# Patient Record
Sex: FEMALE | Race: WHITE | NOT HISPANIC OR LATINO | Employment: FULL TIME | URBAN - METROPOLITAN AREA
[De-identification: names, ages, dates, MRNs, and addresses within clinical notes are randomized per-mention and may not be internally consistent; named-entity substitution may affect disease eponyms.]

---

## 2023-07-05 PROBLEM — J20.9 BRONCHITIS, ACUTE: Status: ACTIVE | Noted: 2023-07-05

## 2023-07-05 PROBLEM — H66.92 LEFT OTITIS MEDIA: Status: RESOLVED | Noted: 2023-07-05 | Resolved: 2023-07-05

## 2023-07-05 PROBLEM — H65.92 LEFT SEROUS OTITIS MEDIA: Status: ACTIVE | Noted: 2023-07-05

## 2023-07-05 PROBLEM — J01.90 SINUSITIS, ACUTE: Status: ACTIVE | Noted: 2023-07-05

## 2023-07-05 PROBLEM — H66.91 RIGHT OTITIS MEDIA: Status: ACTIVE | Noted: 2023-07-05

## 2023-07-05 RX ORDER — MULTIVITAMIN
1 TABLET ORAL DAILY
COMMUNITY

## 2023-07-05 RX ORDER — FLUTICASONE PROPIONATE 50 MCG
1 SPRAY, SUSPENSION (ML) NASAL DAILY
COMMUNITY

## 2023-07-06 ENCOUNTER — LAB (OUTPATIENT)
Dept: LAB | Facility: LAB | Age: 43
End: 2023-07-06
Payer: COMMERCIAL

## 2023-07-06 ENCOUNTER — OFFICE VISIT (OUTPATIENT)
Dept: PRIMARY CARE | Facility: CLINIC | Age: 43
End: 2023-07-06
Payer: COMMERCIAL

## 2023-07-06 VITALS — BODY MASS INDEX: 29.44 KG/M2 | SYSTOLIC BLOOD PRESSURE: 120 MMHG | DIASTOLIC BLOOD PRESSURE: 64 MMHG | WEIGHT: 188 LBS

## 2023-07-06 DIAGNOSIS — J45.20 MILD INTERMITTENT ASTHMA, UNSPECIFIED WHETHER COMPLICATED (HHS-HCC): ICD-10-CM

## 2023-07-06 DIAGNOSIS — Z82.49 FAMILY HISTORY OF CORONARY ARTERY DISEASE: ICD-10-CM

## 2023-07-06 DIAGNOSIS — Z00.00 WELL ADULT EXAM: Primary | ICD-10-CM

## 2023-07-06 DIAGNOSIS — R63.5 WEIGHT GAIN: ICD-10-CM

## 2023-07-06 DIAGNOSIS — Z12.31 ENCOUNTER FOR SCREENING MAMMOGRAM FOR MALIGNANT NEOPLASM OF BREAST: ICD-10-CM

## 2023-07-06 DIAGNOSIS — Z00.00 WELL ADULT EXAM: ICD-10-CM

## 2023-07-06 LAB
ALANINE AMINOTRANSFERASE (SGPT) (U/L) IN SER/PLAS: 21 U/L (ref 7–45)
ALBUMIN (G/DL) IN SER/PLAS: 4.6 G/DL (ref 3.4–5)
ALKALINE PHOSPHATASE (U/L) IN SER/PLAS: 45 U/L (ref 33–110)
ANION GAP IN SER/PLAS: 21 MMOL/L (ref 10–20)
ASPARTATE AMINOTRANSFERASE (SGOT) (U/L) IN SER/PLAS: 25 U/L (ref 9–39)
BILIRUBIN TOTAL (MG/DL) IN SER/PLAS: 0.4 MG/DL (ref 0–1.2)
CALCIDIOL (25 OH VITAMIN D3) (NG/ML) IN SER/PLAS: 25 NG/ML
CALCIUM (MG/DL) IN SER/PLAS: 9.4 MG/DL (ref 8.6–10.3)
CARBON DIOXIDE, TOTAL (MMOL/L) IN SER/PLAS: 27 MMOL/L (ref 21–32)
CHLORIDE (MMOL/L) IN SER/PLAS: 99 MMOL/L (ref 98–107)
CHOLESTEROL (MG/DL) IN SER/PLAS: 231 MG/DL (ref 0–199)
CHOLESTEROL IN HDL (MG/DL) IN SER/PLAS: 55.4 MG/DL
CHOLESTEROL/HDL RATIO: 4.2
COBALAMIN (VITAMIN B12) (PG/ML) IN SER/PLAS: 480 PG/ML (ref 211–911)
CREATININE (MG/DL) IN SER/PLAS: 0.63 MG/DL (ref 0.5–1.05)
GFR FEMALE: >90 ML/MIN/1.73M2
GLUCOSE (MG/DL) IN SER/PLAS: 90 MG/DL (ref 74–99)
LDL: 154 MG/DL (ref 0–99)
POTASSIUM (MMOL/L) IN SER/PLAS: 4.6 MMOL/L (ref 3.5–5.3)
PROTEIN TOTAL: 7.5 G/DL (ref 6.4–8.2)
SODIUM (MMOL/L) IN SER/PLAS: 142 MMOL/L (ref 136–145)
THYROTROPIN (MIU/L) IN SER/PLAS BY DETECTION LIMIT <= 0.05 MIU/L: 1.28 MIU/L (ref 0.44–3.98)
TRIGLYCERIDE (MG/DL) IN SER/PLAS: 106 MG/DL (ref 0–149)
UREA NITROGEN (MG/DL) IN SER/PLAS: 10 MG/DL (ref 6–23)
VLDL: 21 MG/DL (ref 0–40)

## 2023-07-06 PROCEDURE — 36415 COLL VENOUS BLD VENIPUNCTURE: CPT

## 2023-07-06 PROCEDURE — 99396 PREV VISIT EST AGE 40-64: CPT | Performed by: FAMILY MEDICINE

## 2023-07-06 PROCEDURE — 82607 VITAMIN B-12: CPT

## 2023-07-06 PROCEDURE — 82306 VITAMIN D 25 HYDROXY: CPT

## 2023-07-06 PROCEDURE — 84443 ASSAY THYROID STIM HORMONE: CPT

## 2023-07-06 PROCEDURE — 80053 COMPREHEN METABOLIC PANEL: CPT

## 2023-07-06 PROCEDURE — 1036F TOBACCO NON-USER: CPT | Performed by: FAMILY MEDICINE

## 2023-07-06 PROCEDURE — 80061 LIPID PANEL: CPT

## 2023-07-06 RX ORDER — LEVONORGESTREL 52 MG/1
1 INTRAUTERINE DEVICE INTRAUTERINE ONCE
COMMUNITY

## 2023-07-06 RX ORDER — ALBUTEROL SULFATE 90 UG/1
2 AEROSOL, METERED RESPIRATORY (INHALATION) EVERY 6 HOURS PRN
Qty: 8.5 G | Refills: 3 | Status: SHIPPED | OUTPATIENT
Start: 2023-07-06 | End: 2023-07-20

## 2023-07-06 RX ORDER — OMEPRAZOLE 20 MG/1
20 CAPSULE, DELAYED RELEASE ORAL
COMMUNITY
End: 2023-07-20 | Stop reason: SDUPTHER

## 2023-07-06 ASSESSMENT — PATIENT HEALTH QUESTIONNAIRE - PHQ9
SUM OF ALL RESPONSES TO PHQ9 QUESTIONS 1 AND 2: 0
1. LITTLE INTEREST OR PLEASURE IN DOING THINGS: NOT AT ALL
2. FEELING DOWN, DEPRESSED OR HOPELESS: NOT AT ALL

## 2023-07-06 ASSESSMENT — ENCOUNTER SYMPTOMS
GASTROINTESTINAL NEGATIVE: 1
RESPIRATORY NEGATIVE: 1
CONSTITUTIONAL NEGATIVE: 1
RHINORRHEA: 1
SLEEP DISTURBANCE: 1
CARDIOVASCULAR NEGATIVE: 1
NERVOUS/ANXIOUS: 1

## 2023-07-06 NOTE — PROGRESS NOTES
Subjective   Patient ID: Earlene Tadeo is a 43 y.o. female who presents for New Patient Visit (Establish care. Wants to talk about her weight. ).    Pt presents to [facility] and [facility]    Moved to [place] about 2 years ago, was near [place] prior    Weight gain over the last several years  She has always been physically active  Recent move  Does not have the best diet   [facility]     P Med Hx: seasonal allergies, GERD  Asthma   P Surg HX: Bladder surgery (postpartum issues)  Tonsillectomy  Septoplasty    Meds: Flonase, Prilosec  MVI    NKDA    Fam Hx: Mother: CHF, defibrillator and pacemaker, high cholesterol  Mother: duodenal CA    HM: last PAP: one year ago, has another one scheduled, follows with GYN  Is due for a mammogram     Exercise: cardio: several times a week, walking and lifts weights  Caffeine intake: gave up soda this year   2 cups of coffee  Adequate water intake  No tobacco  Alcohol: socially            Review of Systems   Constitutional: Negative.    HENT:  Positive for rhinorrhea.         Seasonal allergies  Takes Flonase  PRN Albuterol      Respiratory: Negative.     Cardiovascular: Negative.    Gastrointestinal: Negative.    Genitourinary:         Mirena IUD  No menses   Allergic/Immunologic: Positive for environmental allergies.   Psychiatric/Behavioral:  Positive for sleep disturbance. Negative for suicidal ideas. The patient is nervous/anxious.         Anxiety/depression  Was treated with meds in the past, weight gain  Denies SI/HI       Objective   /64   Wt 85.3 kg (188 lb)   BMI 29.44 kg/m²     Physical Exam  Constitutional:       Appearance: Normal appearance.   HENT:      Right Ear: Tympanic membrane, ear canal and external ear normal.      Left Ear: Tympanic membrane, ear canal and external ear normal.   Eyes:      Extraocular Movements: Extraocular movements intact.      Conjunctiva/sclera: Conjunctivae normal.      Pupils:  Pupils are equal, round, and reactive to light.   Cardiovascular:      Rate and Rhythm: Normal rate and regular rhythm.      Heart sounds: Normal heart sounds.   Pulmonary:      Effort: Pulmonary effort is normal.      Breath sounds: Normal breath sounds.   Abdominal:      General: Abdomen is flat. Bowel sounds are normal.      Palpations: Abdomen is soft.   Musculoskeletal:         General: Normal range of motion.   Skin:     General: Skin is warm and dry.   Neurological:      General: No focal deficit present.      Mental Status: She is alert and oriented to person, place, and time. Mental status is at baseline.   Psychiatric:         Mood and Affect: Mood normal.         Behavior: Behavior normal.         Thought Content: Thought content normal.         Judgment: Judgment normal.       Pt is new to our practice, P Med Hx, P Surg Hx, Fam Hx, Meds and Allergies all reviewed today    Assessment/Plan   Diagnoses and all orders for this visit:  Well adult exam  -     Lipid Panel; Future  -     Comprehensive metabolic panel; Future  -     Referral to Ophthalmology; Future  Mild intermittent asthma, unspecified whether complicated  -     albuterol (ProAir HFA) 90 mcg/actuation inhaler; Inhale 2 puffs every 6 hours if needed for wheezing or shortness of breath.  Family history of coronary artery disease  -     Lipid Panel; Future  -     Comprehensive metabolic panel; Future  Encounter for screening mammogram for malignant neoplasm of breast  -     BI mammo bilateral screening tomosynthesis; Future  Weight gain  -     Tsh With Reflex To Free T4 If Abnormal; Future  -     Vitamin B12; Future  -     Vitamin D 25-Hydroxy,Total; Future  -     Referral to Nutrition Services; Future  -     Referral to Comprehensive Weight Loss; Future    Follow up pending lab results  Discussed w/ pt we can manage PAP and well woman care if she wishes to see our office for this  Advised pt to call sooner with any questions or concerns   Melba A  Christiano, DO

## 2023-07-11 ENCOUNTER — TELEPHONE (OUTPATIENT)
Dept: PRIMARY CARE | Facility: CLINIC | Age: 43
End: 2023-07-11
Payer: COMMERCIAL

## 2023-07-11 NOTE — TELEPHONE ENCOUNTER
Discussed with patient. Patient would like to know what are the affects to having low vit d levels?

## 2023-07-11 NOTE — TELEPHONE ENCOUNTER
Please notify the pt that her Vit D was low at 25. I would recc she take Vitamin D 3, 2,000 units daily.     Melba Alvarado, DO

## 2023-07-12 DIAGNOSIS — J45.20 MILD INTERMITTENT ASTHMA, UNSPECIFIED WHETHER COMPLICATED (HHS-HCC): ICD-10-CM

## 2023-07-12 DIAGNOSIS — K21.9 GASTROESOPHAGEAL REFLUX DISEASE, UNSPECIFIED WHETHER ESOPHAGITIS PRESENT: Primary | ICD-10-CM

## 2023-07-20 RX ORDER — ALBUTEROL SULFATE 90 UG/1
2 AEROSOL, METERED RESPIRATORY (INHALATION) EVERY 6 HOURS PRN
Qty: 17 G | Refills: 3 | Status: SHIPPED | OUTPATIENT
Start: 2023-07-20 | End: 2024-07-19

## 2023-07-20 RX ORDER — OMEPRAZOLE 20 MG/1
20 CAPSULE, DELAYED RELEASE ORAL
Qty: 90 CAPSULE | Refills: 0 | Status: SHIPPED | OUTPATIENT
Start: 2023-07-20 | End: 2023-11-06 | Stop reason: SDUPTHER

## 2023-09-25 ENCOUNTER — OFFICE VISIT (OUTPATIENT)
Dept: PRIMARY CARE | Facility: CLINIC | Age: 43
End: 2023-09-25
Payer: COMMERCIAL

## 2023-09-25 VITALS
HEIGHT: 67 IN | DIASTOLIC BLOOD PRESSURE: 80 MMHG | BODY MASS INDEX: 30.13 KG/M2 | SYSTOLIC BLOOD PRESSURE: 126 MMHG | WEIGHT: 192 LBS

## 2023-09-25 DIAGNOSIS — F32.A DEPRESSION, UNSPECIFIED DEPRESSION TYPE: Primary | ICD-10-CM

## 2023-09-25 PROBLEM — J01.90 SINUSITIS, ACUTE: Status: RESOLVED | Noted: 2023-07-05 | Resolved: 2023-09-25

## 2023-09-25 PROBLEM — H66.91 RIGHT OTITIS MEDIA: Status: RESOLVED | Noted: 2023-07-05 | Resolved: 2023-09-25

## 2023-09-25 PROBLEM — J20.9 BRONCHITIS, ACUTE: Status: RESOLVED | Noted: 2023-07-05 | Resolved: 2023-09-25

## 2023-09-25 PROBLEM — H65.92 LEFT SEROUS OTITIS MEDIA: Status: RESOLVED | Noted: 2023-07-05 | Resolved: 2023-09-25

## 2023-09-25 PROCEDURE — 1036F TOBACCO NON-USER: CPT | Performed by: FAMILY MEDICINE

## 2023-09-25 PROCEDURE — 99214 OFFICE O/P EST MOD 30 MIN: CPT | Performed by: FAMILY MEDICINE

## 2023-09-25 RX ORDER — BUPROPION HYDROCHLORIDE 150 MG/1
150 TABLET ORAL EVERY MORNING
Qty: 30 TABLET | Refills: 2 | Status: SHIPPED | OUTPATIENT
Start: 2023-09-25 | End: 2023-11-06 | Stop reason: SDUPTHER

## 2023-09-25 RX ORDER — PSYLLIUM HUSK 0.4 G
5 CAPSULE ORAL 4 TIMES DAILY
COMMUNITY

## 2023-09-25 RX ORDER — BUTYROSPERMUM PARKII(SHEA BUTTER), SIMMONDSIA CHINENSIS (JOJOBA) SEED OIL, ALOE BARBADENSIS LEAF EXTRACT .01; 1; 3.5 G/100G; G/100G; G/100G
250 LIQUID TOPICAL 2 TIMES DAILY
COMMUNITY

## 2023-09-25 ASSESSMENT — PATIENT HEALTH QUESTIONNAIRE - PHQ9
SUM OF ALL RESPONSES TO PHQ9 QUESTIONS 1 & 2: 2
1. LITTLE INTEREST OR PLEASURE IN DOING THINGS: SEVERAL DAYS
10. IF YOU CHECKED OFF ANY PROBLEMS, HOW DIFFICULT HAVE THESE PROBLEMS MADE IT FOR YOU TO DO YOUR WORK, TAKE CARE OF THINGS AT HOME, OR GET ALONG WITH OTHER PEOPLE: VERY DIFFICULT
2. FEELING DOWN, DEPRESSED OR HOPELESS: SEVERAL DAYS

## 2023-09-25 ASSESSMENT — ENCOUNTER SYMPTOMS: CONSTITUTIONAL NEGATIVE: 1

## 2023-09-25 NOTE — PROGRESS NOTES
"Subjective   Patient ID: Earlene Tadeo is a 43 y.o. female who presents for fatigue and feeling down.    Pt presents for follow up:     She is being seen at the weight loss clinic   She was started on Ozempic, this is an 8 week program  She is also receiving B 12 injections   She has lost body fat, gaining muscle  She is lifting weights and cardio 4-5 times per week     She overall is not feeling well,mentally and physically  She started to feel down a few months ago, did not want to get out of bed  Took a few sick days   This was around her menses  She has an IUD in place  She has waves of feeling down and fatigued, unmotivated   She has tried anti depressants, most recent one she gained weight   She thinks it was Lexapro  No SI/H              Review of Systems   Constitutional: Negative.    Psychiatric/Behavioral:          Pos for depression       Objective   Ht 1.702 m (5' 7\")   Wt 87.1 kg (192 lb)   BMI 30.07 kg/m²     Physical Exam  Constitutional:       Appearance: Normal appearance.   Cardiovascular:      Rate and Rhythm: Normal rate and regular rhythm.      Heart sounds: Normal heart sounds.   Pulmonary:      Effort: Pulmonary effort is normal.      Breath sounds: Normal breath sounds.   Abdominal:      General: Abdomen is flat. Bowel sounds are normal.      Palpations: Abdomen is soft.   Neurological:      Mental Status: She is alert.   Psychiatric:         Attention and Perception: Attention normal.         Mood and Affect: Mood normal.         Speech: Speech normal.         Behavior: Behavior normal.         Thought Content: Thought content normal.         Judgment: Judgment normal.         Assessment/Plan   Diagnoses and all orders for this visit:  Depression, unspecified depression type  -     buPROPion XL (Wellbutrin XL) 150 mg 24 hr tablet; Take 1 tablet (150 mg) by mouth once daily in the morning. Do not crush, chew, or split.  Discussed R/B/SE of this medication   Follow up appt in 6 weeks, advised " pt to call sooner with any questions or concerns    Melba Alvarado, DO

## 2023-11-02 DIAGNOSIS — K21.9 GASTROESOPHAGEAL REFLUX DISEASE, UNSPECIFIED WHETHER ESOPHAGITIS PRESENT: ICD-10-CM

## 2023-11-02 RX ORDER — OMEPRAZOLE 20 MG/1
20 CAPSULE, DELAYED RELEASE ORAL
Qty: 90 CAPSULE | Refills: 3 | OUTPATIENT
Start: 2023-11-02

## 2023-11-06 ENCOUNTER — OFFICE VISIT (OUTPATIENT)
Dept: PRIMARY CARE | Facility: CLINIC | Age: 43
End: 2023-11-06
Payer: COMMERCIAL

## 2023-11-06 DIAGNOSIS — F32.A DEPRESSION, UNSPECIFIED DEPRESSION TYPE: ICD-10-CM

## 2023-11-06 DIAGNOSIS — Z13.39 ADHD (ATTENTION DEFICIT HYPERACTIVITY DISORDER) EVALUATION: Primary | ICD-10-CM

## 2023-11-06 DIAGNOSIS — K21.9 GASTROESOPHAGEAL REFLUX DISEASE, UNSPECIFIED WHETHER ESOPHAGITIS PRESENT: ICD-10-CM

## 2023-11-06 PROCEDURE — 1036F TOBACCO NON-USER: CPT | Performed by: FAMILY MEDICINE

## 2023-11-06 PROCEDURE — 99214 OFFICE O/P EST MOD 30 MIN: CPT | Performed by: FAMILY MEDICINE

## 2023-11-06 RX ORDER — OMEPRAZOLE 20 MG/1
20 CAPSULE, DELAYED RELEASE ORAL
Qty: 90 CAPSULE | Refills: 1 | Status: SHIPPED | OUTPATIENT
Start: 2023-11-06

## 2023-11-06 RX ORDER — BUPROPION HYDROCHLORIDE 150 MG/1
150 TABLET ORAL EVERY MORNING
Qty: 90 TABLET | Refills: 1 | Status: SHIPPED | OUTPATIENT
Start: 2023-11-06 | End: 2024-02-05 | Stop reason: SDUPTHER

## 2023-11-06 ASSESSMENT — ENCOUNTER SYMPTOMS
CONSTIPATION: 1
NERVOUS/ANXIOUS: 1
ABDOMINAL PAIN: 0
BLOOD IN STOOL: 0

## 2023-11-06 NOTE — PROGRESS NOTES
Subjective   Patient ID: Earlene Tadeo is a 43 y.o. female who presents for Follow-up (6 week follow up after starting Bupropion ), Med Refill (Omeprazole and Bupropion- [facility]), and concentration (Discuss possible ADHD, difficulty concentrating, focusing, and remembering conversations).    Pt presents for followup:     Has been on Wellbutrin x 6 weeks  She is concerned about ADHD  She is waking up several times per night   No OTC meds  Overall she feels better on the Wellbutrin    GERD  She has missed her Prilosec and she feels her heartburn  She did see GI in the past, no scope per pt   She has been on Prlosec x 8 months  She is needing [employer] as well as TUMS or baking soda   Alcohol: socially  Caffeine: 2 cups/coffee/ per day,mushroom coffee  Rare  NSAID use   She has cur out soda  Reviewed her diet: she limits tomato based products  High protein diet          Review of Systems   Gastrointestinal:  Positive for constipation. Negative for abdominal pain and blood in stool.        Pos GERD   Psychiatric/Behavioral:  The patient is nervous/anxious.        Objective   There were no vitals taken for this visit.    Physical Exam  Constitutional:       Appearance: Normal appearance.   Cardiovascular:      Rate and Rhythm: Normal rate and regular rhythm.      Heart sounds: Normal heart sounds.   Pulmonary:      Effort: Pulmonary effort is normal.      Breath sounds: Normal breath sounds.   Abdominal:      General: Bowel sounds are normal. There is no distension.      Palpations: Abdomen is soft. There is no mass.      Tenderness: There is no abdominal tenderness. There is no guarding or rebound.      Hernia: No hernia is present.   Neurological:      Mental Status: She is alert.         Assessment/Plan   Diagnoses and all orders for this visit:  ADHD (attention deficit hyperactivity disorder) evaluation  -     Referral to Neurology; Future  Depression, unspecified depression type  -     buPROPion XL  (Wellbutrin XL) 150 mg 24 hr tablet; Take 1 tablet (150 mg) by mouth once daily in the morning. Do not crush, chew, or split.  Gastroesophageal reflux disease, unspecified whether esophagitis present  -     omeprazole (PriLOSEC) 20 mg DR capsule; Take 1 capsule (20 mg) by mouth once daily in the morning. Take before meals. Do not crush or chew.  -     Referral to Gastroenterology; Future    T/C dose increase of the wellbutrin over the next several months  Reviewed food to avoid with GERD  Follow up appt in 3 months  Advised pt to call sooner with any questions or concerns  Melba Alvarado, DO

## 2023-11-20 ENCOUNTER — NUTRITION (OUTPATIENT)
Dept: NUTRITION | Facility: CLINIC | Age: 43
End: 2023-11-20
Payer: COMMERCIAL

## 2023-11-20 DIAGNOSIS — R63.5 WEIGHT GAIN: Primary | ICD-10-CM

## 2023-11-20 PROCEDURE — 97802 MEDICAL NUTRITION INDIV IN: CPT | Performed by: DIETITIAN, REGISTERED

## 2023-11-20 NOTE — PROGRESS NOTES
Reason for Nutrition Visit:  Pt is a 43 y.o. female being seen at [facility] referred for:  1. Weight gain          Lab Results   Component Value Date    CHOL 231 (H) 07/06/2023    LDLF 154 (H) 07/06/2023    TRIG 106 07/06/2023    HDL 55.4 07/06/2023      Food and Nutrition Hx:  Pt mentions struggling with weight management for many years. She met with her PCP who recommended seeing a RDN. She tells that she has had elevated cholesterol since she was a teenage but never needed to get on medications and has lowered it on her own through lifestyle changes. She does mention a family hx of CVD with her mom and HTN on both sides. She has tried [facility] and tried a GLP-1, which helped but had to stop after 8 weeks and doesn't want to pay out of pocket for it. She is a vegetarian and eats a more carbohydrate focused diet. She sees a  and training for a half marathon in the Spring. She also gave up diet coke this last year. She makes shakes with benefiber, collagen and plant based protein. She does snack in the evening on snacks like frozen pizza.    24 Diet Recall:  Meal 1: skipped  Meal 2: pc of pizza leftover  Meal 3: 2 pcs of pizza  Snacks: cheese puffs, glass of non-alcoholic wine and osborne lite  Beverages: 4-5 glasses of water, 2 cups of coffee, wine or one beer a few times per week, unsweetened tea when eating out    Usual Diet Recall:  Meal 1: perfect protein bar or protein shake--benefiber, collagen, PB2 powder, Sakara Metabolism Powder and Ka'Ayush protein powder with water (some days fasts until lunch), coffee or mushroom powder  Snacks: if in the office--baked goods, candy  Meal 2: protein shake again or perfect bar  Meal 3: salad, frozen vegetables, with pizza, tofu, or plant based burger  Snack: popcorn, pretzels, pizza    Allergies: None  Intolerance: milk sometimes hard to digest, but cheese and yogurt  Appetite: Good  Intake: >75%  GI Symptoms : reflux Frequency:  intermittent  Swallowing Difficulty: No problems with swallowing  Dentition : own    Types of Activities:  1x per week, weight training 3x per week, walks daily and cardio 3-4x per week  Duration: 30-60 minutes daily    Sleep duration/quality : 7+ hours and disrupted sleep  Sleep disorders: none    Supplements: Multivitamin, Vitamin D, Calcium, Potassium, Probiotic, and Psyllium Husk  daily    Feelings of Hunger?: Yes and will eat  Physical Feeling: Sluggish  Binging: Periods of restriction then overeating  Cravings: Sweet  Energy Levels: Stable    Food Preparation: Partner/Spouse  Cooking Skills/Barriers: None reported  Grocery Shopping: Partner/Spouse    Eating Out Type: Restaurant and Take Out  1-2x per week  Convenience Foods: Canned Soup and frozen entrees      Nutrition Focused Physical Exam:    Performed/Deferred: Deferred as pt visually appears well-nourished with no signs of malnutrition    Muscle Wasting:  Temporal: None  Shoulder: None  None  Interosseous Muscle: None  Quadriceps: None    Loss of Subcutaneous Fat:  Eyes: None  Perioral: None  Triceps: None  Chest: None    Other Physical Findings:  Hair: None  None  Mouth: None  Skin: None  Nails: None  none    Malnutrition Present: No    Estimated Energy Needs:    Weight Maintanence: 1881 kcal/day, MSJ=1568x1.2, 87 g/pro/day, and 1 g/pro/kg/day  Weight Loss Needs: 1631 kcal/day, MSJ: 1881-250, 69 g/pro/day, and .8 g/pro/kg/day    Nutrition Diagnosis:    Diagnosis Statement 1:  Diagnosis Status: New  Diagnosis : Altered nutrition related lab values  related to  metabolic dysfunction  as evidenced by  high cholesterol (231) and LDL (154) on 07/06/2023    Diagnosis Statement 2:  Diagnosis Status: New  Diagnosis : Food and nutrition related knowledge deficit related to lack of or limited prior nutrition-related education as evidenced by no prior knowledge of need for food- and nutrition-related recommendations    Diagnosis Statement 3:    Diagnosis Status: New  Diagnosis : Inadequate fiber intake  related to food and nutrition related knowledge deficit concerning desirable quantities of fiber as evidenced by estimated intake of fiber that is insufficient when compared to recommended amounts (38 g/day for men and 25 g/day for women)    Nutrition Interventions:  Anti-Inflammatory Diet, Decreased Sodium Diet, Increased Fiber Diet, Increased Fluid Intake, Increased Omega-3 Diet, Increased Protein Diet, Increased Vitamin D Intake, and Low Saturated Fat Diet  Nutrition Counseling: Motivational Interviewing and Goal Setting  Coordination of Care: None    Nutrition Goals:  Nutrition Goals : Adequate fluid intake: 64 oz+  Decrease intake of added sugars  Decrease intake of saturated fats  Decrease sodium intake  Increase awareness and respond to hunger cues  Increase awareness and respond to satiety cues  Reduce Kcal Intake  Reduce LDL level  Weight Loss    Nutrition Recommendations:  Via teach back method patient verbalized understanding of the following topics:  1) Make a plate that is 1/2 filled with non-starchy vegetables (any vegetable other than potatoes, peas or corn), 1/4 filled with whole grain/starch and 1/4 lean protein. This will help increase fiber intake and keep you full after eating.  2) Advised pt to avoid over-reliance of supplements like protein bars and shakes. Recommended only using these once per day. Instead focus on whole, intact food sources to help nourish you correctly after workouts. In your protein shake, add whole fruit like berries, bananas, scoop of peanut butter (not peanut butter powder), handful of spinach and milk. This will help you feel gregorio and increase satiety to reduce cravings and mindless snacking habits.  3) Always include a protein food with snacks to make you feel gregorio and reduce appetite. Try adding protein foods like peanut butter, nuts, low fat cheese, eggs, yogurt, or cottage cheese to snacks. Aim for  20-30 grams of protein per meal and 7-10 grams at snacks.  4) Can use psyllium husk or apple cider vinegar before meals to help reduce appetite, and improve insulin sensitivity.    Educational Handouts: ADA Placemat and Blueberry Protein Shake, Greek Yogurt Bowl, Avocado, Egg and Sweet Potato    Readiness to Change : Good  Level of Understanding : Good  Anticipated Compliant : Good

## 2023-11-20 NOTE — PATIENT INSTRUCTIONS
1) Consider following a Mediterranean Diet approach to help improve health and reduce risk/manage chronic disease. Focus on eating more unprocessed foods including fruits, vegetables, whole grains, legumes, nuts, fish, poultry, eggs, low fat cheese and dairy (cottage cheese, greek yogurt, and kefir). Include variety and color in your diet with fresh or frozen varieties of fruits and vegetables regularly included at your meals.  2) Decrease intake of processed foods with added fats, sugar, and salt. Reduce salt or sodium intake to less than 1500mg of sodium per day. Instead of adding table salt for flavor, use more herbs (basil, thyme, mint, parsley, cilantro, and dill), seasonings (cinnamon, cloves, oregano, fennel seed, and chili powder) and other flavors (roberto, garlic, and turmeric) when cooking. Use vegetable oil such as olive, canola, safflower, soybean and corn instead of butter, lard, or whole-fat dairy sources when cooking.  3) Eat less red meat including lean beef, pork or lamb to only a few times per month. Lean poultry including skinless chicken and turkey can be eaten a few times per week with a serving being 2-3 ounces. Focus on plant based protein sources such as nuts (almonds, walnuts, and pistachios), seeds (sunflower, flax, luke), legumes (navy beans, kidney beans, black beans, chickpeas, and lentils), tofu, tempeh, and soy products.  4) Eat more fresh or canned fish including shellfish, salmon, tuna, sardines and herring several times a week. These contain beneficial omega-3 fatty acids and have a low saturated fat content.  5) Choose more whole grains for their added fiber content. Whole grains include 100% whole wheat bread products, brown rice, popcorn, oatmeal, quinoa, and couscous. Try to eat fruit for something sweet such as fruit parfait made with unsweetened greek yogurt and homemade granola or low sugar fruit desserts.  6) Include physical activity into your day with a goal to meet 150  minutes of moderate-intensity aerobic activity (walking, biking, swimming, or housework) every week. Strength-training or weight-bearing exercise should be included twice a week if you are physically able to.

## 2024-02-05 ENCOUNTER — OFFICE VISIT (OUTPATIENT)
Dept: PRIMARY CARE | Facility: CLINIC | Age: 44
End: 2024-02-05
Payer: COMMERCIAL

## 2024-02-05 VITALS
DIASTOLIC BLOOD PRESSURE: 80 MMHG | TEMPERATURE: 98.2 F | SYSTOLIC BLOOD PRESSURE: 112 MMHG | BODY MASS INDEX: 30.11 KG/M2 | WEIGHT: 192.24 LBS

## 2024-02-05 DIAGNOSIS — F32.A DEPRESSION, UNSPECIFIED DEPRESSION TYPE: Primary | ICD-10-CM

## 2024-02-05 DIAGNOSIS — Z13.39 ADHD (ATTENTION DEFICIT HYPERACTIVITY DISORDER) EVALUATION: ICD-10-CM

## 2024-02-05 DIAGNOSIS — R05.2 SUBACUTE COUGH: ICD-10-CM

## 2024-02-05 PROCEDURE — 99213 OFFICE O/P EST LOW 20 MIN: CPT | Performed by: FAMILY MEDICINE

## 2024-02-05 PROCEDURE — 1036F TOBACCO NON-USER: CPT | Performed by: FAMILY MEDICINE

## 2024-02-05 RX ORDER — BUPROPION HYDROCHLORIDE 150 MG/1
150 TABLET ORAL EVERY MORNING
Qty: 90 TABLET | Refills: 3 | Status: SHIPPED | OUTPATIENT
Start: 2024-02-05 | End: 2024-08-03

## 2024-02-05 ASSESSMENT — ENCOUNTER SYMPTOMS
CARDIOVASCULAR NEGATIVE: 1
SHORTNESS OF BREATH: 0
SLEEP DISTURBANCE: 0
COUGH: 1
NERVOUS/ANXIOUS: 0
STRIDOR: 0
GASTROINTESTINAL NEGATIVE: 1
WHEEZING: 0
CONSTITUTIONAL NEGATIVE: 1

## 2024-02-05 NOTE — PROGRESS NOTES
Subjective   Patient ID: Earlene Tadeo is a 43 y.o. female who presents for No chief complaint on file..    Pt presents for follow up    Cough  Present x 3 weeks, had a URI  Uses her inhaler, about 2 times a week    Depression  She is taking Wellbutrin         Review of Systems   Constitutional: Negative.    Respiratory:  Positive for cough. Negative for shortness of breath, wheezing and stridor.    Cardiovascular: Negative.    Gastrointestinal: Negative.    Psychiatric/Behavioral:  Negative for self-injury, sleep disturbance and suicidal ideas. The patient is not nervous/anxious.         See HPI       Objective   /80 (BP Location: Right arm, Patient Position: Sitting)   Temp 36.8 °C (98.2 °F)   Wt 87.2 kg (192 lb 3.9 oz)   BMI 30.11 kg/m²     Physical Exam  Constitutional:       Appearance: Normal appearance.   Cardiovascular:      Rate and Rhythm: Normal rate and regular rhythm.   Pulmonary:      Effort: Pulmonary effort is normal. No respiratory distress.      Breath sounds: Normal breath sounds. No stridor. No wheezing, rhonchi or rales.   Chest:      Chest wall: No tenderness.   Neurological:      Mental Status: She is alert.   Psychiatric:         Mood and Affect: Mood normal.         Assessment/Plan   Diagnoses and all orders for this visit:  Depression, unspecified depression type  -     buPROPion XL (Wellbutrin XL) 150 mg 24 hr tablet; Take 1 tablet (150 mg) by mouth once daily in the morning. Do not crush, chew, or split.  ADHD (attention deficit hyperactivity disorder) evaluation  -     Referral to Neurology; Future  Subacute cough    OTC Mucinex, cool mist humidifier and PRN Albuterol INH  Follow up for well adult exam Summer 2024    Melba Alvarado,

## 2024-03-01 ENCOUNTER — APPOINTMENT (OUTPATIENT)
Dept: NUTRITION | Facility: CLINIC | Age: 44
End: 2024-03-01
Payer: COMMERCIAL

## 2024-07-10 ENCOUNTER — APPOINTMENT (OUTPATIENT)
Dept: PRIMARY CARE | Facility: CLINIC | Age: 44
End: 2024-07-10
Payer: COMMERCIAL

## 2024-08-08 ENCOUNTER — APPOINTMENT (OUTPATIENT)
Dept: PRIMARY CARE | Facility: CLINIC | Age: 44
End: 2024-08-08
Payer: COMMERCIAL

## 2024-08-08 VITALS
HEART RATE: 67 BPM | HEIGHT: 67 IN | BODY MASS INDEX: 29.03 KG/M2 | SYSTOLIC BLOOD PRESSURE: 120 MMHG | WEIGHT: 185 LBS | DIASTOLIC BLOOD PRESSURE: 68 MMHG | OXYGEN SATURATION: 97 %

## 2024-08-08 DIAGNOSIS — K51.919 ULCERATIVE COLITIS WITH COMPLICATION, UNSPECIFIED LOCATION (MULTI): ICD-10-CM

## 2024-08-08 DIAGNOSIS — Z00.00 HEALTHCARE MAINTENANCE: Primary | ICD-10-CM

## 2024-08-08 DIAGNOSIS — Z12.31 VISIT FOR SCREENING MAMMOGRAM: ICD-10-CM

## 2024-08-08 DIAGNOSIS — F32.A DEPRESSION, UNSPECIFIED DEPRESSION TYPE: ICD-10-CM

## 2024-08-08 DIAGNOSIS — G43.009 MIGRAINE WITHOUT AURA AND WITHOUT STATUS MIGRAINOSUS, NOT INTRACTABLE: ICD-10-CM

## 2024-08-08 DIAGNOSIS — K21.9 GASTROESOPHAGEAL REFLUX DISEASE WITHOUT ESOPHAGITIS: ICD-10-CM

## 2024-08-08 DIAGNOSIS — J45.20 MILD INTERMITTENT ASTHMA, UNSPECIFIED WHETHER COMPLICATED (HHS-HCC): ICD-10-CM

## 2024-08-08 DIAGNOSIS — Z11.59 NEED FOR HEPATITIS C SCREENING TEST: ICD-10-CM

## 2024-08-08 DIAGNOSIS — Z13.220 SCREENING, LIPID: ICD-10-CM

## 2024-08-08 DIAGNOSIS — J30.89 ENVIRONMENTAL AND SEASONAL ALLERGIES: ICD-10-CM

## 2024-08-08 DIAGNOSIS — E55.9 VITAMIN D DEFICIENCY: ICD-10-CM

## 2024-08-08 PROCEDURE — 3008F BODY MASS INDEX DOCD: CPT | Performed by: NURSE PRACTITIONER

## 2024-08-08 PROCEDURE — 99396 PREV VISIT EST AGE 40-64: CPT | Performed by: NURSE PRACTITIONER

## 2024-08-08 PROCEDURE — 99214 OFFICE O/P EST MOD 30 MIN: CPT | Performed by: NURSE PRACTITIONER

## 2024-08-08 RX ORDER — SUMATRIPTAN SUCCINATE 100 MG/1
TABLET ORAL
COMMUNITY
Start: 2024-06-04 | End: 2024-08-08 | Stop reason: SDUPTHER

## 2024-08-08 RX ORDER — FLUTICASONE PROPIONATE 50 MCG
1 SPRAY, SUSPENSION (ML) NASAL DAILY
Qty: 16 G | Refills: 3 | Status: SHIPPED | OUTPATIENT
Start: 2024-08-08

## 2024-08-08 RX ORDER — HYDROXYZINE HYDROCHLORIDE 10 MG/1
10 TABLET, FILM COATED ORAL NIGHTLY PRN
Qty: 15 TABLET | Refills: 0 | Status: SHIPPED | OUTPATIENT
Start: 2024-08-08

## 2024-08-08 RX ORDER — BUPROPION HYDROCHLORIDE 150 MG/1
150 TABLET ORAL EVERY MORNING
Qty: 90 TABLET | Refills: 1 | Status: SHIPPED | OUTPATIENT
Start: 2024-08-08 | End: 2025-02-04

## 2024-08-08 RX ORDER — SUMATRIPTAN SUCCINATE 100 MG/1
100 TABLET ORAL ONCE AS NEEDED
Qty: 9 TABLET | Refills: 5 | Status: SHIPPED | OUTPATIENT
Start: 2024-08-08

## 2024-08-08 RX ORDER — METRONIDAZOLE 7.5 MG/G
CREAM TOPICAL
COMMUNITY
Start: 2024-05-09

## 2024-08-08 RX ORDER — ALBUTEROL SULFATE 90 UG/1
2 INHALANT RESPIRATORY (INHALATION) EVERY 6 HOURS PRN
Qty: 17 G | Refills: 3 | Status: SHIPPED | OUTPATIENT
Start: 2024-08-08 | End: 2025-08-08

## 2024-08-08 RX ORDER — ONDANSETRON 4 MG/1
4 TABLET, ORALLY DISINTEGRATING ORAL EVERY 6 HOURS PRN
COMMUNITY
Start: 2024-06-03

## 2024-08-08 RX ORDER — TRETINOIN 0.25 MG/G
CREAM TOPICAL
COMMUNITY
Start: 2024-03-14

## 2024-08-08 ASSESSMENT — PATIENT HEALTH QUESTIONNAIRE - PHQ9
SUM OF ALL RESPONSES TO PHQ9 QUESTIONS 1 & 2: 0
1. LITTLE INTEREST OR PLEASURE IN DOING THINGS: NOT AT ALL
2. FEELING DOWN, DEPRESSED OR HOPELESS: NOT AT ALL

## 2024-08-08 NOTE — PROGRESS NOTES
Annual Comprehensive Medical Exam:    44 y.o. female presents for annual comprehensive medical evaluation and preventive services screening.    Former pt of Dr Melba Alvarado     Recent hospitalizations, surgeries or ER visits: denies     Diet:   mix of healthy and unhealthy  Caffeine per day: 1-3  Water per day:  lots  Exercise: regularly   Alcohol: few per week   Tobacco: denies   Pap/Pelvic: Dr Ilana Singh 7/19/23   Mammogram: 7/6/23  DEXA:   Colonoscopy:  5-8 yrs ago   Cologuard:    Allowed to report any questions or concerns.    Depression:  Med: Wellbutrin   Feels stable on current dose.    Denies thoughts of suicide or homicide.  Recently dog passed away     GERD  Med: Omeprazole     Managed well  Tolerating without side effects     Migraine:   Med: Imitrex  Very well controlled     Asthma:  Well controlled     H/o ulcerative colitis:   Would like referral to gastro  Has had colonosocpy in past 5-8 yrs ago     Past Medical History:   Diagnosis Date    Bronchitis, acute 07/05/2023    Left serous otitis media 07/05/2023    Right otitis media 07/05/2023    Sinusitis, acute 07/05/2023      History reviewed. No pertinent surgical history.  Family History   Problem Relation Name Age of Onset    No Known Problems Mother      No Known Problems Father        Social History     Socioeconomic History    Marital status:      Spouse name: Not on file    Number of children: Not on file    Years of education: Not on file    Highest education level: Not on file   Occupational History    Not on file   Tobacco Use    Smoking status: Never    Smokeless tobacco: Never   Substance and Sexual Activity    Alcohol use: Not on file    Drug use: Never    Sexual activity: Not on file   Other Topics Concern    Not on file   Social History Narrative    Not on file     Social Determinants of Health     Financial Resource Strain: Not on file   Food Insecurity: Not on file   Transportation Needs: Not on file   Physical Activity: Not  "on file   Stress: Not on file   Social Connections: Not on file   Intimate Partner Violence: Not on file   Housing Stability: Not on file       Current Outpatient Medications on File Prior to Visit   Medication Sig Dispense Refill    albuterol 90 mcg/actuation inhaler Inhale 2 puffs every 6 hours if needed for wheezing or shortness of breath. 17 g 3    buPROPion XL (Wellbutrin XL) 150 mg 24 hr tablet Take 1 tablet (150 mg) by mouth once daily in the morning. Do not crush, chew, or split. 90 tablet 3    cholecalciferol, vitamin D3, (VITAMIN D3 ORAL) Take by mouth.      fluticasone (Flonase) 50 mcg/actuation nasal spray Administer 1 spray into each nostril once daily. Shake gently. Before first use, prime pump. After use, clean tip and replace cap.      levonorgestrel (Mirena) 21 mcg/24 hours (8 yrs) 52 mg IUD 52 mg by intrauterine route 1 time.      multivitamin tablet Take 1 tablet by mouth once daily.      omeprazole (PriLOSEC) 20 mg DR capsule Take 1 capsule (20 mg) by mouth once daily in the morning. Take before meals. Do not crush or chew. 90 capsule 1    psyllium (Metamucil) 0.4 gram capsule Take 5 capsules by mouth 4 times a day.      saccharomyces boulardii (Florastor) 250 mg capsule Take 1 capsule (250 mg) by mouth 2 times a day.       No current facility-administered medications on file prior to visit.       Allergies   Allergen Reactions    Bee Venom Protein (Honey Bee) Hives    Latex Rash         Visit Vitals  /68   Pulse 67   Ht 1.702 m (5' 7\")   Wt 83.9 kg (185 lb)   SpO2 97%   BMI 28.98 kg/m²   Smoking Status Never   BSA 1.99 m²        Physical Exam  Gen: Alert and oriented x3 female in no acute distress.  HEENT: Head is normocephalic.  Neck is supple without carotid bruits  Heart: Regular rate and rhythm without murmurs.  Lungs: Clear to auscultation bilaterally.  Breast:        Deferred to Gyn  Pelvic:           Deferred to Gyn   Abdomen: Soft with normal bowel sounds.  No masses or pain to " palpation.   Extremities: Good range of motion of all joints.  No significant edema. Pedal pulses +1-2/4  Neuro: No signs of focal neurologic deficit.  No tremor.  Speech and hearing are normal.  DTRs +3/4;  Muscle Strength +5/5.  Musculoskeletal: Spine with good ROM.  Leg lengths are equal.  Skin: No significant or irregular nevi visualized.  Psych: normal affect.  No suicidal ideation.  Good judgment and insight.    Diagnosis/Plan:     1. Healthcare maintenance    - Comprehensive metabolic panel; Future  - CBC; Future  - Lipid panel; Future  - TSH with reflex to Free T4 if abnormal; Future  - Urinalysis with Reflex Microscopic; Future    2. Migraine without aura and without status migrainosus, not intractable  Stable.  Continue current medications.  - SUMAtriptan (Imitrex) 100 mg tablet; Take 1 tablet (100 mg) by mouth 1 time if needed for migraine.  Dispense: 9 tablet; Refill: 5    3. Depression, unspecified depression type  Stable.   Continue current medication/treatment plan.     Aware at any time if thoughts of suicide or homicide are present contact medical services immediately.    - buPROPion XL (Wellbutrin XL) 150 mg 24 hr tablet; Take 1 tablet (150 mg) by mouth once daily in the morning. Do not crush, chew, or split.  Dispense: 90 tablet; Refill: 1  - hydrOXYzine HCL (Atarax) 10 mg tablet; Take 1 tablet (10 mg) by mouth as needed at bedtime for anxiety.  Dispense: 15 tablet; Refill: 0    4. Mild intermittent asthma, unspecified whether complicated ([facility])  - albuterol 90 mcg/actuation inhaler; Inhale 2 puffs every 6 hours if needed for wheezing or shortness of breath.  Dispense: 17 g; Refill: 3    5. Ulcerative colitis with complication, unspecified location (Multi)    - Referral to Gastroenterology; Future    6. Environmental and seasonal allergies    - fluticasone (Flonase) 50 mcg/actuation nasal spray; Administer 1 spray into each nostril once daily. Shake gently. Before first use, prime pump. After  use, clean tip and replace cap.  Dispense: 16 g; Refill: 3    7. Screening, lipid  - Lipid panel; Future    8. Vitamin D deficiency  Vitamin D lab ordered. If your level is low,  a script for a weekly dose of Vitamin D will be sent to pharmacy. You should start or continue a Multivitamin.    - Vitamin D 25-Hydroxy,Total (for eval of Vitamin D levels); Future    9. Need for hepatitis C screening test    - Hepatitis C antibody; Future    10. Visit for screening mammogram    - BI mammo bilateral screening tomosynthesis; Future    11. Gastroesophageal reflux disease without esophagitis  Continue Prilosec.   Referred to GI      Medications refills will be completed as discussed.     Any labs or testing that is ordered will be reviewed and the results will be in your chart .   You can review these via  [employer].     Follow up six months for medication management.     Prescriptions will not be filled unless you are compliant with your follow-up appointments or have a follow-up appointment scheduled as per the instruction of your provider. Refills for medications should be requested at the time of your office visit.     Please allow one week for refill requests to be completed.     [facility] can help with scheduling referrals: [phone]   Mammogram [phone]  Physical Therapy [phone]    Contact office with any questions or concerns.   Preferred communication is via  [employer]  Please contact [email or web address] if having issues with  [employer]    Apurva MENDIOLA-[facility]  [address], [address]  [place] [address]  Phone: [phone]    **Charting was completed using voice recognition technology and may include unintended errors**

## 2024-08-18 PROBLEM — F32.A DEPRESSION: Status: ACTIVE | Noted: 2024-08-18

## 2024-08-18 PROBLEM — J30.89 ENVIRONMENTAL AND SEASONAL ALLERGIES: Status: ACTIVE | Noted: 2024-08-18

## 2024-08-18 PROBLEM — Z00.00 HEALTHCARE MAINTENANCE: Status: ACTIVE | Noted: 2024-08-18

## 2024-08-18 PROBLEM — K51.919 ULCERATIVE COLITIS WITH COMPLICATION (MULTI): Status: ACTIVE | Noted: 2024-08-18

## 2024-08-18 PROBLEM — K21.9 GASTROESOPHAGEAL REFLUX DISEASE WITHOUT ESOPHAGITIS: Status: ACTIVE | Noted: 2024-08-18

## 2024-08-18 PROBLEM — G43.009 MIGRAINE WITHOUT AURA AND WITHOUT STATUS MIGRAINOSUS, NOT INTRACTABLE: Status: ACTIVE | Noted: 2024-08-18

## 2024-08-23 ENCOUNTER — HOSPITAL ENCOUNTER (OUTPATIENT)
Dept: RADIOLOGY | Facility: CLINIC | Age: 44
Discharge: HOME | End: 2024-08-23
Payer: COMMERCIAL

## 2024-08-23 VITALS — BODY MASS INDEX: 29.03 KG/M2 | HEIGHT: 67 IN | WEIGHT: 184.97 LBS

## 2024-08-23 DIAGNOSIS — Z12.31 VISIT FOR SCREENING MAMMOGRAM: ICD-10-CM

## 2024-08-23 PROCEDURE — 77067 SCR MAMMO BI INCL CAD: CPT

## 2024-11-05 ENCOUNTER — OFFICE VISIT (OUTPATIENT)
Dept: PRIMARY CARE | Facility: CLINIC | Age: 44
End: 2024-11-05
Payer: COMMERCIAL

## 2024-11-05 VITALS
HEART RATE: 72 BPM | OXYGEN SATURATION: 97 % | DIASTOLIC BLOOD PRESSURE: 82 MMHG | HEIGHT: 67 IN | SYSTOLIC BLOOD PRESSURE: 128 MMHG | WEIGHT: 192 LBS | BODY MASS INDEX: 30.13 KG/M2

## 2024-11-05 DIAGNOSIS — J06.9 UPPER RESPIRATORY TRACT INFECTION, UNSPECIFIED TYPE: Primary | ICD-10-CM

## 2024-11-05 DIAGNOSIS — K21.9 GASTROESOPHAGEAL REFLUX DISEASE WITHOUT ESOPHAGITIS: ICD-10-CM

## 2024-11-05 PROCEDURE — 99214 OFFICE O/P EST MOD 30 MIN: CPT | Performed by: NURSE PRACTITIONER

## 2024-11-05 PROCEDURE — 3008F BODY MASS INDEX DOCD: CPT | Performed by: NURSE PRACTITIONER

## 2024-11-05 RX ORDER — OMEPRAZOLE 20 MG/1
20 CAPSULE, DELAYED RELEASE ORAL DAILY
Qty: 30 CAPSULE | Refills: 5 | Status: SHIPPED | OUTPATIENT
Start: 2024-11-05 | End: 2025-05-04

## 2024-11-05 RX ORDER — AZITHROMYCIN 250 MG/1
TABLET, FILM COATED ORAL
Qty: 6 TABLET | Refills: 0 | Status: SHIPPED | OUTPATIENT
Start: 2024-11-05 | End: 2024-11-10

## 2024-11-05 ASSESSMENT — PATIENT HEALTH QUESTIONNAIRE - PHQ9
2. FEELING DOWN, DEPRESSED OR HOPELESS: NOT AT ALL
1. LITTLE INTEREST OR PLEASURE IN DOING THINGS: NOT AT ALL
SUM OF ALL RESPONSES TO PHQ9 QUESTIONS 1 & 2: 0

## 2024-11-05 NOTE — PROGRESS NOTES
"Subjective   Patient ID: Earlene Tadeo is a 44 y.o. female who presents for cough and chest congestion.    HPI     Presents today for acute care visit via telehealth:     Reports the following symptoms since Sunday    Headache/Sinus pressure: denies   Fever/chills: denies  Nasal congestion/post nasal drip: yes  Cough: yes  Sputum: mostly clear but slight yellow  Ear pain/pressure: both slight pressure   Sore throat: yes   Shortness of breath: yes   Drinking to stay hydrated: yes   Fatigue: yes    Nicotine use:  Denies     Patient has taken:   Nyquil and Dayquil  Inhaler 2-3 times a day   Theraflu     Children both ill tx with antibiotic and inhaler    Past Medical History:   Diagnosis Date    Bronchitis, acute 07/05/2023    Left serous otitis media 07/05/2023    Right otitis media 07/05/2023    Sinusitis, acute 07/05/2023         Review of Systems    Objective   /82   Pulse 72   Ht 1.702 m (5' 7\")   Wt 87.1 kg (192 lb)   SpO2 97%   BMI 30.07 kg/m²     Physical Exam    Alert and oriented x 3  Neck without lymphadenopathy   Otoscope exam normal   Heart regular rate and rhythm without murmur  Lungs clear to auscultation.  Speech clear.  Hearing adequate.  Psych: Normal affect. Good judgment and insight.       Assessment/Plan     1. Upper respiratory tract infection, unspecified type (Primary)    Complete prescribed antibiotics as directed. Eat yogurt or take a probiotic (not within the hour of taking the antibiotic) while taking antibiotics.   Increase fluid intake throughout the day.    Irrigate your nose with saline spray 2-4 times per day.   Antihistamine nasal sprays (like Azelastine) also help with nasal congestion and sinus infection. Side effects of Azelastine include an occasional bitter taste. You can reduce the bitter taste by leaning forward, directing the spray toward the outside of your nose, and not sniffing for a few minutes.    Mucinex  DM for cough  Use inhaler at least 3 times a day until " cough resolving   Contact the office if not improving after completing the antibiotics.     - inhalational spacing device inhaler; Use as instructed  Dispense: 1 each; Refill: 0  - azithromycin (Zithromax) 250 mg tablet; Take 2 tablets (500 mg) by mouth once daily for 1 day, THEN 1 tablet (250 mg) once daily for 4 days. Take 2 tabs (500 mg) by mouth today, than 1 daily for 4 days..  Dispense: 6 tablet; Refill: 0    2. Gastroesophageal reflux disease without esophagitis    - omeprazole (PriLOSEC) 20 mg DR capsule; Take 1 capsule (20 mg) by mouth once daily. Do not crush or chew.  Dispense: 30 capsule; Refill: 5    Medications refills will be completed as discussed.     Any labs or testing that is ordered will be reviewed and the results will be in your chart .   You can review these via  [employer].     Prescriptions will not be filled unless you are compliant with your follow-up appointments or have a follow-up appointment scheduled as per the instruction of your provider. Refills for medications should be requested at the time of your office visit.     Please allow one week for refill requests to be completed.     Contact office with any questions or concerns.   Preferred communication is via  [employer]      Call [employer]: [phone] to assist with scheduling.      Apurva MENDIOLA-[facility]  [address], [address]  [place] [address]  Phone: [phone]    **Charting was completed using voice recognition technology and may include unintended errors**

## 2024-12-05 ENCOUNTER — OFFICE VISIT (OUTPATIENT)
Dept: GASTROENTEROLOGY | Facility: CLINIC | Age: 44
End: 2024-12-05
Payer: COMMERCIAL

## 2024-12-05 VITALS
RESPIRATION RATE: 16 BRPM | HEART RATE: 73 BPM | HEIGHT: 67 IN | DIASTOLIC BLOOD PRESSURE: 93 MMHG | TEMPERATURE: 96.8 F | BODY MASS INDEX: 29.98 KG/M2 | WEIGHT: 191 LBS | SYSTOLIC BLOOD PRESSURE: 131 MMHG

## 2024-12-05 DIAGNOSIS — K51.919 ULCERATIVE COLITIS WITH COMPLICATION, UNSPECIFIED LOCATION (MULTI): ICD-10-CM

## 2024-12-05 NOTE — PROGRESS NOTES
Subjective   Patient ID: Earlene Tadeo is a 44 y.o. female who presents for No chief complaint on file.. PMH: GERD, depresssion, migraines, asthma  HPI    Colonoscopy 5-8 years ago and has a h/o      Review of Systems    Objective   Physical Exam    Assessment/Plan   {Assess/PlanSmartLinks:82244}         MARLENA Mena-CNP 12/05/24 8:29 AM

## 2024-12-18 ENCOUNTER — APPOINTMENT (OUTPATIENT)
Dept: PRIMARY CARE | Facility: CLINIC | Age: 44
End: 2024-12-18
Payer: COMMERCIAL

## 2024-12-18 VITALS
HEART RATE: 74 BPM | WEIGHT: 194 LBS | SYSTOLIC BLOOD PRESSURE: 118 MMHG | HEIGHT: 67 IN | DIASTOLIC BLOOD PRESSURE: 78 MMHG | OXYGEN SATURATION: 97 % | BODY MASS INDEX: 30.45 KG/M2

## 2024-12-18 DIAGNOSIS — G43.009 MIGRAINE WITHOUT AURA AND WITHOUT STATUS MIGRAINOSUS, NOT INTRACTABLE: Primary | ICD-10-CM

## 2024-12-18 DIAGNOSIS — K21.9 GASTROESOPHAGEAL REFLUX DISEASE WITHOUT ESOPHAGITIS: ICD-10-CM

## 2024-12-18 PROCEDURE — 99214 OFFICE O/P EST MOD 30 MIN: CPT | Performed by: NURSE PRACTITIONER

## 2024-12-18 PROCEDURE — 3008F BODY MASS INDEX DOCD: CPT | Performed by: NURSE PRACTITIONER

## 2024-12-18 RX ORDER — OMEPRAZOLE 20 MG/1
20 CAPSULE, DELAYED RELEASE ORAL DAILY
Qty: 90 CAPSULE | Refills: 0 | Status: SHIPPED | OUTPATIENT
Start: 2024-12-18

## 2024-12-18 RX ORDER — PROPRANOLOL HYDROCHLORIDE 80 MG/1
80 CAPSULE, EXTENDED RELEASE ORAL DAILY
Qty: 30 CAPSULE | Refills: 11 | Status: SHIPPED | OUTPATIENT
Start: 2024-12-18 | End: 2025-12-18

## 2024-12-18 RX ORDER — SUMATRIPTAN SUCCINATE 100 MG/1
100 TABLET ORAL ONCE AS NEEDED
Qty: 9 TABLET | Refills: 5 | Status: SHIPPED | OUTPATIENT
Start: 2024-12-18

## 2024-12-18 NOTE — PROGRESS NOTES
"Subjective   Patient ID: Earlene Tadeo is a 44 y.o. female who presents for Headaches and GI issues .    HPI     Headaches and Migraine  Feels like her headaches are increasing infrequency--at least a few every week.   Migraines are occurring at least 5-6 times a month.   Last migraine lasted 3 days.   Noted it to be mainly in her left eye.   C/o sensitivity to light and sound  Also +nausea  Imitrex helps but it knocks her out and she can not work      GERD: GI Debbie Monge CNP  Med: Omeprazole  Asking for refills    Depression:   Med: Wellbutrin  Stable     Past Medical History:   Diagnosis Date    Bronchitis, acute 07/05/2023    Left serous otitis media 07/05/2023    Right otitis media 07/05/2023    Sinusitis, acute 07/05/2023         Review of Systems    Objective   /78   Pulse 74   Ht 1.702 m (5' 7\")   Wt 88 kg (194 lb)   SpO2 97%   BMI 30.38 kg/m²     Physical Exam    Alert and oriented x 3  Appears healthy  Does not appear/sound dyspneic with conversation  Speech clear.  Hearing adequate.  Psych: Normal affect. Good judgment and insight.       Assessment/Plan     1. Gastroesophageal reflux disease without esophagitis  Follow-up with specialist per their discretion/direction.   - omeprazole (PriLOSEC) 20 mg DR capsule; Take 1 capsule (20 mg) by mouth once daily. Do not crush or chew.  Dispense: 90 capsule; Refill: 0    2. Migraine without aura and without status migrainosus, not intractable (Primary)  - propranolol LA (Inderal LA) 80 mg 24 hr capsule; Take 1 capsule (80 mg) by mouth once daily. Do not crush, chew, or split.  Dispense: 30 capsule; Refill: 11  - SUMAtriptan (Imitrex) 100 mg tablet; Take 1 tablet (100 mg) by mouth 1 time if needed for migraine.  Dispense: 9 tablet; Refill: 5    Follow up with Dr Koby Mathews (in office headache specialist) if continues with symptoms.       Contact office with any questions or concerns.   Preferred communication is via  [employer]      Call  Services: " [phone] to assist with scheduling.      Apurva Caldera APRCARLOS-[facility]  [address], [address]  [place] [address]  Phone: [phone]    **Charting was completed using voice recognition technology and may include unintended errors**

## 2025-01-15 NOTE — PROGRESS NOTES
Earlene Tadeo is a 44 y.o. female with past medical history of GERD and migraines who presents today for ulcerative colitis. Diagnosed with UC 2017 at [facility] in [place] after she developed pain and bleeding and underwent colonoscopy by Dr. Wilkinson. Records currently unavailable. Initially treated with suppository. She did this about 3 times then stopped because she did not like them. The pain and bleeding eventually got better on its own. Would come and go.     Then this last year got worse. Lots of mucus, will leak throughout the day even when not having BM. This is very uncomfortable. Currently moving bowels a few times per week. Intermittently will have severe lower abdominal pain that will last about 2 weeks, associated with bloating, nausea. This is occurring every 6 weeks. Takes omeprazole for GERD which works well but if she misses a dose symptoms return.    No prior EGD. Last colonoscopy at diagnosis.     Social history: Never tobacco. Social alcohol. Denies illicit drugs.    Family history: Mother has colitis, unsure what type. Denies family history of colon cancer or other GI disorders or malignancy.       Current Outpatient Medications   Medication Sig Dispense Refill    albuterol 90 mcg/actuation inhaler Inhale 2 puffs every 6 hours if needed for wheezing or shortness of breath. 17 g 3    buPROPion XL (Wellbutrin XL) 150 mg 24 hr tablet Take 1 tablet (150 mg) by mouth once daily in the morning. Do not crush, chew, or split. 90 tablet 1    fluticasone (Flonase) 50 mcg/actuation nasal spray Administer 1 spray into each nostril once daily. Shake gently. Before first use, prime pump. After use, clean tip and replace cap. 16 g 3    inhalational spacing device inhaler Use as instructed 1 each 0    levonorgestrel (Mirena) 21 mcg/24 hours (8 yrs) 52 mg IUD 52 mg by intrauterine route 1 time.      metroNIDAZOLE (Metrocream) 0.75 % cream APPLY TO FACE ONCE TO TWICE A DAY      omeprazole (PriLOSEC)  "20 mg DR capsule Take 1 capsule (20 mg) by mouth once daily. Do not crush or chew. 90 capsule 0    ondansetron ODT (Zofran-ODT) 4 mg disintegrating tablet Dissolve 1 tablet (4 mg) in the mouth every 6 hours if needed. DISSOLVE ON THE TONGUE      propranolol LA (Inderal LA) 80 mg 24 hr capsule Take 1 capsule (80 mg) by mouth once daily. Do not crush, chew, or split. 30 capsule 11    SUMAtriptan (Imitrex) 100 mg tablet Take 1 tablet (100 mg) by mouth 1 time if needed for migraine. 9 tablet 5    tretinoin (Retin-A) 0.025 % cream APPLY A PEA SIZE AMOUNT TO FACE EVERY OTHER NIGHT AS TOLERATED AND THEN EVERY NIGHT      dicyclomine (Bentyl) 20 mg tablet Take 1 tablet (20 mg) by mouth 4 times a day. 120 tablet 11     No current facility-administered medications for this visit.       Review of Systems  Review of Systems negative except as noted in HPI.    Objective     /84   Pulse 77   Temp 36.3 °C (97.4 °F)   Ht 1.702 m (5' 7\")   Wt 90.2 kg (198 lb 12.8 oz)   BMI 31.14 kg/m²      Physical Exam  Constitutional:  No acute distress. Normal appearance. Not ill-appearing.  HENT:  Head normocephalic and atraumatic. Conjunctivae normal.  Cardiovascular:  Normal rate. Regular rhythm.  Pulmonary:  Pulmonary effort normal. No respiratory distress. Breath sounds clear.  Abdominal:  Abdomen is soft. There is no distension. No tenderness or guarding.  Skin: Dry.  Neurological:  Alert and oriented.  Psychiatric:  Mood and affect normal.    Assessment/Plan     44 y.o. female with history of GERD and migraines who presents today for initial clinic visit for UC. She was diagnosed with colitis 2017. She never started therapy. She did well for several years but now has frequent mucus/leakage with pain episodes, bloating, nausea, and heartburn.     Discussed restaging disease with labs, baseline fecal calprotectin, and EGD/colonoscopy to guide medication management. She is agreeable.    Recommendations  We will attempt to obtain " prior colonoscopy and pathology report from previous provider.  Start Bentyl as needed for abdominal cramping.  Obtain labs and baseline fecal calprotectin stool test.  Schedule EGD and colonoscopy with Miralax prep.  Follow up after procedure.     Electronically signed by: Cierra Nunez CNP on 1/24/2025 at 8:39 AM

## 2025-01-24 ENCOUNTER — OFFICE VISIT (OUTPATIENT)
Dept: GASTROENTEROLOGY | Facility: CLINIC | Age: 45
End: 2025-01-24
Payer: COMMERCIAL

## 2025-01-24 VITALS
BODY MASS INDEX: 31.2 KG/M2 | HEART RATE: 77 BPM | SYSTOLIC BLOOD PRESSURE: 124 MMHG | HEIGHT: 67 IN | TEMPERATURE: 97.4 F | WEIGHT: 198.8 LBS | DIASTOLIC BLOOD PRESSURE: 84 MMHG

## 2025-01-24 DIAGNOSIS — K51.919 ULCERATIVE COLITIS WITH COMPLICATION, UNSPECIFIED LOCATION (MULTI): Primary | ICD-10-CM

## 2025-01-24 DIAGNOSIS — K21.9 CHRONIC GERD: ICD-10-CM

## 2025-01-24 DIAGNOSIS — R10.9 ABDOMINAL CRAMPING: ICD-10-CM

## 2025-01-24 DIAGNOSIS — R12 HEARTBURN: ICD-10-CM

## 2025-01-24 PROCEDURE — 3008F BODY MASS INDEX DOCD: CPT | Performed by: NURSE PRACTITIONER

## 2025-01-24 PROCEDURE — 99204 OFFICE O/P NEW MOD 45 MIN: CPT | Performed by: NURSE PRACTITIONER

## 2025-01-24 PROCEDURE — 99214 OFFICE O/P EST MOD 30 MIN: CPT | Performed by: NURSE PRACTITIONER

## 2025-01-24 RX ORDER — DICYCLOMINE HYDROCHLORIDE 20 MG/1
20 TABLET ORAL 4 TIMES DAILY
Qty: 120 TABLET | Refills: 11 | Status: SHIPPED | OUTPATIENT
Start: 2025-01-24 | End: 2026-01-24

## 2025-01-24 ASSESSMENT — PAIN SCALES - GENERAL: PAINLEVEL_OUTOF10: 0-NO PAIN

## 2025-01-24 NOTE — PATIENT INSTRUCTIONS
Recommendations  We will attempt to obtain prior colonoscopy and pathology report.  Start Bentyl as needed for abdominal cramping.  Obtain labs and baseline fecal calprotectin stool test.  Schedule EGD and colonoscopy. You can call [phone] to schedule.   Follow up 2 weeks after procedure. Please call the office at [phone] with any questions or concerns.

## 2025-01-29 ENCOUNTER — DOCUMENTATION (OUTPATIENT)
Dept: GASTROENTEROLOGY | Facility: HOSPITAL | Age: 45
End: 2025-01-29
Payer: COMMERCIAL

## 2025-01-29 NOTE — PROGRESS NOTES
Received prior colonoscopy records from 2017. Mild proctitis noted. Path c/w hyperplastic polyp. TI and colon appeared normal with no specimens collected.

## 2025-02-06 ENCOUNTER — APPOINTMENT (OUTPATIENT)
Dept: PRIMARY CARE | Facility: CLINIC | Age: 45
End: 2025-02-06
Payer: COMMERCIAL

## 2025-02-06 VITALS
BODY MASS INDEX: 30.76 KG/M2 | DIASTOLIC BLOOD PRESSURE: 68 MMHG | SYSTOLIC BLOOD PRESSURE: 108 MMHG | HEIGHT: 67 IN | HEART RATE: 91 BPM | OXYGEN SATURATION: 98 % | WEIGHT: 196 LBS

## 2025-02-06 DIAGNOSIS — K21.9 GASTROESOPHAGEAL REFLUX DISEASE WITHOUT ESOPHAGITIS: ICD-10-CM

## 2025-02-06 DIAGNOSIS — G43.009 MIGRAINE WITHOUT AURA AND WITHOUT STATUS MIGRAINOSUS, NOT INTRACTABLE: Primary | ICD-10-CM

## 2025-02-06 DIAGNOSIS — Z11.59 NEED FOR HEPATITIS C SCREENING TEST: ICD-10-CM

## 2025-02-06 DIAGNOSIS — Z79.899 LONG TERM USE OF DRUG: ICD-10-CM

## 2025-02-06 DIAGNOSIS — K51.919 ULCERATIVE COLITIS WITH COMPLICATION, UNSPECIFIED LOCATION (MULTI): ICD-10-CM

## 2025-02-06 DIAGNOSIS — F32.A DEPRESSION, UNSPECIFIED DEPRESSION TYPE: ICD-10-CM

## 2025-02-06 DIAGNOSIS — J45.20 MILD INTERMITTENT ASTHMA WITHOUT COMPLICATION (HHS-HCC): ICD-10-CM

## 2025-02-06 PROCEDURE — 3008F BODY MASS INDEX DOCD: CPT | Performed by: NURSE PRACTITIONER

## 2025-02-06 PROCEDURE — 99214 OFFICE O/P EST MOD 30 MIN: CPT | Performed by: NURSE PRACTITIONER

## 2025-02-06 PROCEDURE — 1036F TOBACCO NON-USER: CPT | Performed by: NURSE PRACTITIONER

## 2025-02-06 RX ORDER — SUMATRIPTAN SUCCINATE 100 MG/1
100 TABLET ORAL ONCE AS NEEDED
Qty: 9 TABLET | Refills: 5 | Status: SHIPPED | OUTPATIENT
Start: 2025-02-06

## 2025-02-06 RX ORDER — PROPRANOLOL HYDROCHLORIDE 160 MG/1
160 CAPSULE, EXTENDED RELEASE ORAL DAILY
Qty: 90 CAPSULE | Refills: 1 | Status: SHIPPED | OUTPATIENT
Start: 2025-02-06

## 2025-02-06 RX ORDER — BUPROPION HYDROCHLORIDE 150 MG/1
150 TABLET ORAL EVERY MORNING
Qty: 90 TABLET | Refills: 1 | Status: SHIPPED | OUTPATIENT
Start: 2025-02-06 | End: 2025-08-05

## 2025-02-06 RX ORDER — OMEPRAZOLE 20 MG/1
20 CAPSULE, DELAYED RELEASE ORAL DAILY
Qty: 90 CAPSULE | Refills: 1 | Status: SHIPPED | OUTPATIENT
Start: 2025-02-06

## 2025-02-06 ASSESSMENT — PATIENT HEALTH QUESTIONNAIRE - PHQ9
2. FEELING DOWN, DEPRESSED OR HOPELESS: NOT AT ALL
SUM OF ALL RESPONSES TO PHQ9 QUESTIONS 1 & 2: 0
1. LITTLE INTEREST OR PLEASURE IN DOING THINGS: NOT AT ALL

## 2025-02-06 NOTE — PROGRESS NOTES
Subjective   Patient ID: Earlene Tadeo is a 44 y.o. female who presents for Med Management.    HPI     Recent hospitalizations, surgeries or ER visits: denies      Diet:   mix of healthy and unhealthy  Caffeine per day: 1-3  Water per day:  lots  Exercise: regularly   Alcohol: 2-4 per week   Tobacco: denies   Pap/Pelvic: Dr Ilana Singh 7/19/23   Mammogram: 7/6/23, 8/23/24  DEXA:   Colonoscopy:  5-8 yrs ago ordered 1/24/25  Cologuard:     Allowed to report any questions or concerns.     Depression:  Med: Wellbutrin   Feels stable on current dose.    Denies thoughts of suicide or homicide.     GERD and Ulcerative Colitis:  Gi Cierra Nunez CNP  Med: Omeprazole, Bentyl if needed  Plans are for colonoscopy and EGD     Migraine:   Med: Imitrex  Started on Inderal LA last visit but did not feel it was that effective  Tried Motrin and Excedrin Migraine  Does not want injections at this time  Several times a month  Always in her forehead area and eyes.   Light sensitivity   +nausea --Zofran works   Yearly eye exam      Asthma:  Well controlled   Med: Albuterol if needed      Past Medical History:   Diagnosis Date    Bronchitis, acute 07/05/2023    Left serous otitis media 07/05/2023    Right otitis media 07/05/2023    Sinusitis, acute 07/05/2023     Past Surgical History:   Procedure Laterality Date    COLONOSCOPY      30s    NASAL SEPTUM SURGERY       Social History     Socioeconomic History    Marital status:      Spouse name: Kash Millard    Number of children: 2    Years of education: Not on file    Highest education level: Not on file   Occupational History    Not on file   Tobacco Use    Smoking status: Never    Smokeless tobacco: Never   Substance and Sexual Activity    Alcohol use: Yes     Comment: 2-4 per week    Drug use: Never    Sexual activity: Not on file   Other Topics Concern    Not on file   Social History Narrative    Not on file     Social Drivers of Health     Financial Resource Strain: Not on  "file   Food Insecurity: Not on file   Transportation Needs: Not on file   Physical Activity: Not on file   Stress: Not on file   Social Connections: Not on file   Intimate Partner Violence: Not on file   Housing Stability: Not on file     Family History   Problem Relation Name Age of Onset    Coronary artery disease Mother      Hyperlipidemia Mother      Kidney disease Mother      Hypertension Mother      Hypothyroidism Mother      Hyperlipidemia Father      Hypertension Father           Review of Systems    Objective   /68   Pulse 91   Ht 1.702 m (5' 7\")   Wt 88.9 kg (196 lb)   SpO2 98%   BMI 30.70 kg/m²     Physical Exam    Alert and oriented x 3  Neck supple without carotid bruit   Heart regular rate and rhythm without murmur  Lungs clear to auscultation.  Gait is non-antalgic  Speech clear.  Hearing adequate.  Psych: Normal affect. Good judgment and insight.       Assessment/Plan     1. Migraine without aura and without status migrainosus, not intractable (Primary)  Increased Inderal LA  - propranolol LA (Inderal LA) 160 mg 24 hr capsule; Take 1 capsule (160 mg) by mouth once daily. Do not crush, chew, or split.  Dispense: 90 capsule; Refill: 1  - SUMAtriptan (Imitrex) 100 mg tablet; Take 1 tablet (100 mg) by mouth 1 time if needed for migraine.  Dispense: 9 tablet; Refill: 5    Some suggestions for preventing or controlling your migraines:  --Magnesium (preferably Glycinate, but oxide or sulfate acceptable) 400-500 mg daily is recommended as a potential migraine preventative treatment by the [employer] headache Society. Side effects include diarrhea.  --Riboflavin (vitamin B2) can be another beneficial migraine preventative treatment. 200 mg twice a day is recommended.  --Coenzyme Q10 150 mg twice daily.  --Melatonin 3-5 mg 2-4 hours before bedtime can be helpful for difficulty with sleeping and headaches, especially cluster headaches but also migraines.  --Avoid triggers that cause or worsen migraines " (food, lack of sleep, stress)   --Keep a diary of her headaches to get them, how long they last, and any other helpful information.  --Avoid taking medication for treatment of headaches (not preventative medication) more than 3 days per week. This includes both prescription medication and over-the-counter medication.  --Take your preventative medication as directed. Let me know if you have side effects or problems with medication. Do not suddenly stopped taking the medication.      2. Mild intermittent asthma without complication (HHS-HCC)  Stable.  Continue current medications.    3. Depression, unspecified depression type    Stable.   Continue current medication/treatment plan.     Aware at any time if thoughts of suicide or homicide are present contact medical services immediately.    - buPROPion XL (Wellbutrin XL) 150 mg 24 hr tablet; Take 1 tablet (150 mg) by mouth once daily in the morning. Do not crush, chew, or split.  Dispense: 90 tablet; Refill: 1    4. Gastroesophageal reflux disease without esophagitis  Stable.  Continue current medications.  - omeprazole (PriLOSEC) 20 mg DR capsule; Take 1 capsule (20 mg) by mouth once daily. Do not crush or chew.  Dispense: 90 capsule; Refill: 1    5. Ulcerative colitis with complication, unspecified location (Multi)  Follow-up with specialist per their discretion/direction.     6. Need for hepatitis C screening test    - Hepatitis C antibody; Future  - Hepatitis C antibody    Follow up: 6 months for CPE     Medications refills will be completed as discussed.     Any labs or testing that is ordered will be reviewed and the results will be in your chart .   You can review these via  [employer].     Prescriptions will not be filled unless you are compliant with your follow-up appointments or have a follow-up appointment scheduled as per the instruction of your provider. Refills for medications should be requested at the time of your office visit.     Please allow one week  for refill requests to be completed.     Contact office with any questions or concerns.   Preferred communication is via  [employer]      Call [employer]: [phone] to assist with scheduling.      Apurva Caldera APRCARLOS-[facility]  [address], [address]  [place] [address]  Phone: [phone]    **Charting was completed using voice recognition technology and may include unintended errors**

## 2025-03-21 ENCOUNTER — ANESTHESIA EVENT (OUTPATIENT)
Dept: GASTROENTEROLOGY | Facility: EXTERNAL LOCATION | Age: 45
End: 2025-03-21

## 2025-03-24 ENCOUNTER — APPOINTMENT (OUTPATIENT)
Dept: PRIMARY CARE | Facility: CLINIC | Age: 45
End: 2025-03-24
Payer: COMMERCIAL

## 2025-03-25 ENCOUNTER — ANESTHESIA (OUTPATIENT)
Dept: GASTROENTEROLOGY | Facility: EXTERNAL LOCATION | Age: 45
End: 2025-03-25

## 2025-03-25 ENCOUNTER — APPOINTMENT (OUTPATIENT)
Dept: GASTROENTEROLOGY | Facility: EXTERNAL LOCATION | Age: 45
End: 2025-03-25
Payer: COMMERCIAL

## 2025-03-25 VITALS
DIASTOLIC BLOOD PRESSURE: 87 MMHG | BODY MASS INDEX: 29.82 KG/M2 | SYSTOLIC BLOOD PRESSURE: 124 MMHG | TEMPERATURE: 98.1 F | WEIGHT: 190 LBS | RESPIRATION RATE: 14 BRPM | HEART RATE: 77 BPM | HEIGHT: 67 IN | OXYGEN SATURATION: 98 %

## 2025-03-25 DIAGNOSIS — K51.919 ULCERATIVE COLITIS WITH COMPLICATION, UNSPECIFIED LOCATION (MULTI): ICD-10-CM

## 2025-03-25 DIAGNOSIS — K21.9 CHRONIC GERD: ICD-10-CM

## 2025-03-25 DIAGNOSIS — R12 HEARTBURN: ICD-10-CM

## 2025-03-25 PROBLEM — Z86.69 HISTORY OF MIGRAINE HEADACHES: Status: ACTIVE | Noted: 2025-03-25

## 2025-03-25 LAB — PREGNANCY TEST URINE, POC: NEGATIVE

## 2025-03-25 PROCEDURE — 88305 TISSUE EXAM BY PATHOLOGIST: CPT | Mod: TC,ELYLAB | Performed by: STUDENT IN AN ORGANIZED HEALTH CARE EDUCATION/TRAINING PROGRAM

## 2025-03-25 PROCEDURE — 45380 COLONOSCOPY AND BIOPSY: CPT | Performed by: STUDENT IN AN ORGANIZED HEALTH CARE EDUCATION/TRAINING PROGRAM

## 2025-03-25 PROCEDURE — 43239 EGD BIOPSY SINGLE/MULTIPLE: CPT | Performed by: STUDENT IN AN ORGANIZED HEALTH CARE EDUCATION/TRAINING PROGRAM

## 2025-03-25 RX ORDER — PROPOFOL 10 MG/ML
INJECTION, EMULSION INTRAVENOUS AS NEEDED
Status: DISCONTINUED | OUTPATIENT
Start: 2025-03-25 | End: 2025-03-25

## 2025-03-25 RX ORDER — LIDOCAINE HYDROCHLORIDE 20 MG/ML
INJECTION, SOLUTION INFILTRATION; PERINEURAL AS NEEDED
Status: DISCONTINUED | OUTPATIENT
Start: 2025-03-25 | End: 2025-03-25

## 2025-03-25 RX ORDER — MIDAZOLAM HYDROCHLORIDE 1 MG/ML
INJECTION, SOLUTION INTRAMUSCULAR; INTRAVENOUS AS NEEDED
Status: DISCONTINUED | OUTPATIENT
Start: 2025-03-25 | End: 2025-03-25

## 2025-03-25 RX ADMIN — PROPOFOL 25 MG: 10 INJECTION, EMULSION INTRAVENOUS at 13:08

## 2025-03-25 RX ADMIN — MIDAZOLAM HYDROCHLORIDE 2 MG: 1 INJECTION, SOLUTION INTRAMUSCULAR; INTRAVENOUS at 12:52

## 2025-03-25 RX ADMIN — LIDOCAINE HYDROCHLORIDE 2.5 ML: 20 INJECTION, SOLUTION INFILTRATION; PERINEURAL at 12:52

## 2025-03-25 RX ADMIN — PROPOFOL 25 MG: 10 INJECTION, EMULSION INTRAVENOUS at 13:05

## 2025-03-25 RX ADMIN — PROPOFOL 25 MG: 10 INJECTION, EMULSION INTRAVENOUS at 12:57

## 2025-03-25 RX ADMIN — PROPOFOL 100 MG: 10 INJECTION, EMULSION INTRAVENOUS at 12:52

## 2025-03-25 RX ADMIN — PROPOFOL 25 MG: 10 INJECTION, EMULSION INTRAVENOUS at 13:15

## 2025-03-25 RX ADMIN — PROPOFOL 25 MG: 10 INJECTION, EMULSION INTRAVENOUS at 12:59

## 2025-03-25 RX ADMIN — PROPOFOL 25 MG: 10 INJECTION, EMULSION INTRAVENOUS at 13:01

## 2025-03-25 RX ADMIN — PROPOFOL 25 MG: 10 INJECTION, EMULSION INTRAVENOUS at 13:03

## 2025-03-25 RX ADMIN — PROPOFOL 25 MG: 10 INJECTION, EMULSION INTRAVENOUS at 13:12

## 2025-03-25 RX ADMIN — PROPOFOL 50 MG: 10 INJECTION, EMULSION INTRAVENOUS at 12:54

## 2025-03-25 SDOH — HEALTH STABILITY: MENTAL HEALTH: CURRENT SMOKER: 0

## 2025-03-25 ASSESSMENT — PAIN - FUNCTIONAL ASSESSMENT
PAIN_FUNCTIONAL_ASSESSMENT: 0-10

## 2025-03-25 ASSESSMENT — PAIN SCALES - GENERAL
PAINLEVEL_OUTOF10: 0 - NO PAIN

## 2025-03-25 ASSESSMENT — COLUMBIA-SUICIDE SEVERITY RATING SCALE - C-SSRS
2. HAVE YOU ACTUALLY HAD ANY THOUGHTS OF KILLING YOURSELF?: NO
6. HAVE YOU EVER DONE ANYTHING, STARTED TO DO ANYTHING, OR PREPARED TO DO ANYTHING TO END YOUR LIFE?: NO
1. IN THE PAST MONTH, HAVE YOU WISHED YOU WERE DEAD OR WISHED YOU COULD GO TO SLEEP AND NOT WAKE UP?: NO

## 2025-03-25 NOTE — SIGNIFICANT EVENT
Pt c.o. left eye hurting. Says it feels like something is in there. Anesthesia @ [facility], NS flushed in eye per anesthesia, Dad @ [facility]

## 2025-03-25 NOTE — H&P
Outpatient NR Procedure H&P    Patient Profile  Name Earlene Tadeo  Date of Birth 1980  MRN [id number]  PCP aDsh Ricardo        Diagnosis: GERD, abdominal pain, diarrhea, ?UC?  Procedure(s):  EGD/Colonoscopy.    Allergies  Allergies   Allergen Reactions    Bee Venom Protein (Honey Bee) Hives    Latex Rash       Past Medical History   Past Medical History:   Diagnosis Date    Asthma     Bronchitis, acute 07/05/2023    Left serous otitis media 07/05/2023    Right otitis media 07/05/2023    Sinusitis, acute 07/05/2023       Medication Reviewed - yes  Prior to Admission medications    Medication Sig Start Date End Date Taking? Authorizing Provider   albuterol 90 mcg/actuation inhaler Inhale 2 puffs every 6 hours if needed for wheezing or shortness of breath. 8/8/24 8/8/25  STACEY Felix   buPROPion XL (Wellbutrin XL) 150 mg 24 hr tablet Take 1 tablet (150 mg) by mouth once daily in the morning. Do not crush, chew, or split. 2/6/25 8/5/25  STACEY Felix   dicyclomine (Bentyl) 20 mg tablet Take 1 tablet (20 mg) by mouth 4 times a day. 1/24/25 1/24/26  STACEY Saucedo   fluticasone (Flonase) 50 mcg/actuation nasal spray Administer 1 spray into each nostril once daily. Shake gently. Before first use, prime pump. After use, clean tip and replace cap. 8/8/24   STACEY Felix   inhalational spacing device inhaler Use as instructed 11/5/24   STACEY Felix   levonorgestrel (Mirena) 21 mcg/24 hours (8 yrs) 52 mg IUD 52 mg by intrauterine route 1 time.    Historical Provider, MD   metroNIDAZOLE (Metrocream) 0.75 % cream APPLY TO FACE ONCE TO TWICE A DAY 5/9/24   Historical Provider, MD   omeprazole (PriLOSEC) 20 mg DR capsule Take 1 capsule (20 mg) by mouth once daily. Do not crush or chew. 2/6/25   STACEY Felix   ondansetron ODT (Zofran-ODT) 4 mg disintegrating tablet Dissolve 1 tablet (4 mg) in the mouth every 6 hours if needed. DISSOLVE ON THE TONGUE 6/3/24    Historical Provider, MD   propranolol LA (Inderal LA) 160 mg 24 hr capsule Take 1 capsule (160 mg) by mouth once daily. Do not crush, chew, or split. 2/6/25   STACEY Felix   SUMAtriptan (Imitrex) 100 mg tablet Take 1 tablet (100 mg) by mouth 1 time if needed for migraine. 2/6/25   STACEY Felix   tretinoin (Retin-A) 0.025 % cream APPLY A PEA SIZE AMOUNT TO FACE EVERY OTHER NIGHT AS TOLERATED AND THEN EVERY NIGHT 3/14/24   Historical Provider, MD       Physical Exam  Vitals:    03/25/25 1229   BP: (!) 121/92   Pulse: 77   Resp: 11   Temp: 36 °C (96.8 °F)   SpO2: 96%      Weight   Vitals:    03/25/25 1229   Weight: 86.2 kg (190 lb)     BMI Body mass index is 29.76 kg/m².    General: A&Ox3, NAD.  HEENT: AT/NC.   CV: RRR. No murmur.  Resp: CTA bilaterally. No wheezing, rhonchi or rales.   GI: Soft, NT/ND.  Extrem: No edema. Pulses intact.  Skin: No Jaundice.   Neuro: No focal deficits.   Psych: Normal mood and affect.        Oropharyngeal Classification III (soft and hard palate and base of uvula visible)  ASA PS Classification 2  Sedation Plan: Deep Sedation.  Procedure Plan - pre-procedural (re)assesment completed by physician:  discharge/transfer patient when discharge criteria met    Delbert Bond DO  3/25/2025 12:48 PM

## 2025-03-25 NOTE — DISCHARGE INSTRUCTIONS

## 2025-03-25 NOTE — ANESTHESIA PREPROCEDURE EVALUATION
Patient: Earlene Tadeo    Procedure Information       Date/Time: 03/25/25 1230    Scheduled providers: Delbert Bond DO    Procedures:       EGD      COLONOSCOPY    Location: [facility]            Relevant Problems   Anesthesia (within normal limits)      Cardiac (within normal limits)      Pulmonary   (+) Mild intermittent asthma without complication (HHS-HCC)      Neuro   (+) Depression   (+) History of migraine headaches      GI   (+) Gastroesophageal reflux disease without esophagitis   (+) Ulcerative colitis with complication (Multi)      /Renal (within normal limits)      Liver (within normal limits)      Endocrine (within normal limits)      Hematology (within normal limits)     Asthma-rlast rescue friday  Clinical information reviewed:   Tobacco  Allergies  Meds   Med Hx  Surg Hx  OB Status  Fam Hx  Soc   Hx        NPO Detail:  NPO/Void Status  Carbohydrate Drink Given Prior to Surgery? : N  Date of Last Liquid: 03/25/25  Time of Last Liquid: 0730  Date of Last Solid: 03/23/25  Time of Last Solid: 2100  Last Intake Type: Clear fluids  Time of Last Void: 1229         Physical Exam    Airway  Mallampati: III  TM distance: >3 FB  Neck ROM: full     Cardiovascular - normal exam  Rhythm: regular  Rate: normal     Dental    Pulmonary - normal exam     Abdominal - normal exam             Anesthesia Plan    History of general anesthesia?: yes  History of complications of general anesthesia?: no    ASA 2     MAC     The patient is not a current smoker.    intravenous induction   Anesthetic plan and risks discussed with patient.

## 2025-04-02 LAB
LABORATORY COMMENT REPORT: NORMAL
PATH REPORT.FINAL DX SPEC: NORMAL
PATH REPORT.GROSS SPEC: NORMAL
PATH REPORT.RELEVANT HX SPEC: NORMAL
PATH REPORT.TOTAL CANCER: NORMAL

## 2025-04-09 ASSESSMENT — PAIN SCALES - GENERAL: PAIN_LEVEL: 0

## 2025-04-09 NOTE — ANESTHESIA POSTPROCEDURE EVALUATION
Patient: Earlene Tadeo    Procedure Summary       Date: 03/25/25 Room / Location: [facility]    Anesthesia Start: 1249 Anesthesia Stop: 1322    Procedures:       EGD      COLONOSCOPY Diagnosis:       Chronic GERD      Heartburn      Ulcerative colitis with complication, unspecified location (Multi)    Scheduled Providers: Delbert Bond DO Responsible Provider: ED Drake    Anesthesia Type: MAC ASA Status: 2            Anesthesia Type: MAC    Vitals Value Taken Time   /87 03/25/25 1352   Temp 36.7 °C (98.1 °F) 03/25/25 1322   Pulse 77 03/25/25 1352   Resp 14 03/25/25 1352   SpO2 98 % 03/25/25 1352       Anesthesia Post Evaluation    Patient location during evaluation: bedside  Patient participation: complete - patient participated  Level of consciousness: awake and alert  Pain score: 0  Pain management: adequate  Airway patency: patent  Cardiovascular status: acceptable  Respiratory status: acceptable  Hydration status: acceptable  Postoperative Nausea and Vomiting: none        No notable events documented.

## 2025-08-12 ENCOUNTER — APPOINTMENT (OUTPATIENT)
Dept: PRIMARY CARE | Facility: CLINIC | Age: 45
End: 2025-08-12
Payer: COMMERCIAL

## 2025-08-12 VITALS
HEIGHT: 67 IN | WEIGHT: 204 LBS | BODY MASS INDEX: 32.02 KG/M2 | HEART RATE: 69 BPM | OXYGEN SATURATION: 97 % | SYSTOLIC BLOOD PRESSURE: 124 MMHG | DIASTOLIC BLOOD PRESSURE: 78 MMHG

## 2025-08-12 DIAGNOSIS — J45.20 MILD INTERMITTENT ASTHMA WITHOUT COMPLICATION (HHS-HCC): ICD-10-CM

## 2025-08-12 DIAGNOSIS — F34.1 PERSISTENT DEPRESSIVE DISORDER: ICD-10-CM

## 2025-08-12 DIAGNOSIS — Z82.49 FAMILY HISTORY OF CORONARY ARTERY DISEASE: ICD-10-CM

## 2025-08-12 DIAGNOSIS — E55.9 VITAMIN D DEFICIENCY: ICD-10-CM

## 2025-08-12 DIAGNOSIS — Z12.31 SCREENING MAMMOGRAM FOR BREAST CANCER: ICD-10-CM

## 2025-08-12 DIAGNOSIS — J30.89 ENVIRONMENTAL AND SEASONAL ALLERGIES: ICD-10-CM

## 2025-08-12 DIAGNOSIS — E66.09 CLASS 1 OBESITY DUE TO EXCESS CALORIES WITHOUT SERIOUS COMORBIDITY WITH BODY MASS INDEX (BMI) OF 31.0 TO 31.9 IN ADULT: ICD-10-CM

## 2025-08-12 DIAGNOSIS — Z11.59 NEED FOR HEPATITIS C SCREENING TEST: ICD-10-CM

## 2025-08-12 DIAGNOSIS — G43.009 MIGRAINE WITHOUT AURA AND WITHOUT STATUS MIGRAINOSUS, NOT INTRACTABLE: ICD-10-CM

## 2025-08-12 DIAGNOSIS — K21.9 GASTROESOPHAGEAL REFLUX DISEASE WITHOUT ESOPHAGITIS: ICD-10-CM

## 2025-08-12 DIAGNOSIS — E78.00 ELEVATED LOW DENSITY LIPOPROTEIN (LDL) CHOLESTEROL LEVEL: ICD-10-CM

## 2025-08-12 DIAGNOSIS — Z63.4 RECENT BEREAVEMENT: ICD-10-CM

## 2025-08-12 DIAGNOSIS — K51.90 QUIESCENT ULCERATIVE COLITIS WITHOUT COMPLICATION (MULTI): ICD-10-CM

## 2025-08-12 DIAGNOSIS — E66.811 CLASS 1 OBESITY DUE TO EXCESS CALORIES WITHOUT SERIOUS COMORBIDITY WITH BODY MASS INDEX (BMI) OF 31.0 TO 31.9 IN ADULT: ICD-10-CM

## 2025-08-12 DIAGNOSIS — Z00.00 HEALTHCARE MAINTENANCE: Primary | ICD-10-CM

## 2025-08-12 PROBLEM — K51.919 ULCERATIVE COLITIS WITH COMPLICATION (MULTI): Status: RESOLVED | Noted: 2024-08-18 | Resolved: 2025-08-12

## 2025-08-12 PROCEDURE — 3008F BODY MASS INDEX DOCD: CPT | Performed by: NURSE PRACTITIONER

## 2025-08-12 PROCEDURE — 99396 PREV VISIT EST AGE 40-64: CPT | Performed by: NURSE PRACTITIONER

## 2025-08-12 PROCEDURE — 99214 OFFICE O/P EST MOD 30 MIN: CPT | Performed by: NURSE PRACTITIONER

## 2025-08-12 RX ORDER — BUPROPION HYDROCHLORIDE 300 MG/1
300 TABLET ORAL EVERY MORNING
Qty: 90 TABLET | Refills: 2 | Status: SHIPPED | OUTPATIENT
Start: 2025-08-12

## 2025-08-12 RX ORDER — OMEPRAZOLE 20 MG/1
20 CAPSULE, DELAYED RELEASE ORAL DAILY
Qty: 90 CAPSULE | Refills: 1 | Status: SHIPPED | OUTPATIENT
Start: 2025-08-12

## 2025-08-12 RX ORDER — SUMATRIPTAN SUCCINATE 100 MG/1
100 TABLET ORAL ONCE AS NEEDED
Qty: 9 TABLET | Refills: 5 | Status: SHIPPED | OUTPATIENT
Start: 2025-08-12

## 2025-08-12 RX ORDER — FLUTICASONE PROPIONATE 50 MCG
1 SPRAY, SUSPENSION (ML) NASAL DAILY
Qty: 16 G | Refills: 3 | Status: SHIPPED | OUTPATIENT
Start: 2025-08-12

## 2025-08-12 RX ORDER — HYDROXYZINE HYDROCHLORIDE 25 MG/1
1 TABLET, FILM COATED ORAL
COMMUNITY
Start: 2025-03-24

## 2025-08-12 SDOH — SOCIAL STABILITY - SOCIAL INSECURITY: DISSAPEARANCE AND DEATH OF FAMILY MEMBER: Z63.4

## 2025-08-17 PROBLEM — E66.811 CLASS 1 OBESITY DUE TO EXCESS CALORIES WITHOUT SERIOUS COMORBIDITY WITH BODY MASS INDEX (BMI) OF 31.0 TO 31.9 IN ADULT: Status: ACTIVE | Noted: 2025-08-17

## 2025-08-17 PROBLEM — Z63.4 RECENT BEREAVEMENT: Status: ACTIVE | Noted: 2025-08-17

## 2025-08-17 PROBLEM — E66.09 CLASS 1 OBESITY DUE TO EXCESS CALORIES WITHOUT SERIOUS COMORBIDITY WITH BODY MASS INDEX (BMI) OF 31.0 TO 31.9 IN ADULT: Status: ACTIVE | Noted: 2025-08-17

## 2025-08-17 PROBLEM — K51.90: Status: ACTIVE | Noted: 2025-08-17

## 2026-02-12 ENCOUNTER — APPOINTMENT (OUTPATIENT)
Dept: PRIMARY CARE | Facility: CLINIC | Age: 46
End: 2026-02-12
Payer: COMMERCIAL